# Patient Record
Sex: FEMALE | Race: ASIAN
[De-identification: names, ages, dates, MRNs, and addresses within clinical notes are randomized per-mention and may not be internally consistent; named-entity substitution may affect disease eponyms.]

---

## 2021-08-19 ENCOUNTER — DASHBOARD ENCOUNTERS (OUTPATIENT)
Age: 46
End: 2021-08-19

## 2021-08-19 ENCOUNTER — OFFICE VISIT (OUTPATIENT)
Dept: URBAN - METROPOLITAN AREA CLINIC 98 | Facility: CLINIC | Age: 46
End: 2021-08-19
Payer: COMMERCIAL

## 2021-08-19 ENCOUNTER — WEB ENCOUNTER (OUTPATIENT)
Dept: URBAN - METROPOLITAN AREA CLINIC 98 | Facility: CLINIC | Age: 46
End: 2021-08-19

## 2021-08-19 VITALS
TEMPERATURE: 96.9 F | WEIGHT: 144.6 LBS | DIASTOLIC BLOOD PRESSURE: 72 MMHG | HEART RATE: 82 BPM | SYSTOLIC BLOOD PRESSURE: 107 MMHG | BODY MASS INDEX: 23.24 KG/M2 | HEIGHT: 66 IN

## 2021-08-19 DIAGNOSIS — K64.8 INTERNAL HEMORRHOID: ICD-10-CM

## 2021-08-19 DIAGNOSIS — K59.09 CHRONIC CONSTIPATION: ICD-10-CM

## 2021-08-19 PROCEDURE — 99204 OFFICE O/P NEW MOD 45 MIN: CPT | Performed by: INTERNAL MEDICINE

## 2021-08-19 PROCEDURE — 99244 OFF/OP CNSLTJ NEW/EST MOD 40: CPT | Performed by: INTERNAL MEDICINE

## 2021-08-19 RX ORDER — ANTI-ITCH 1 G/100G
1 APPLICATION OINTMENT TOPICAL THREE TIMES A DAY
Qty: 1 | Refills: 1 | OUTPATIENT
Start: 2021-08-19 | End: 2021-09-02

## 2021-08-19 RX ORDER — WHEAT DEXTRIN 3 G/3.5 G
AS DIRECTED POWDER (GRAM) ORAL
Status: ACTIVE | COMMUNITY

## 2021-08-19 NOTE — HPI-TODAY'S VISIT:
Ms. Thornton is a 45 yo F presenting for evaluation of hemorrhoids and is referred by Dr. Lashay Baires.  A copy of this report will be sent to Dr. Baires.   She has had 2 weeks of anorectal burning every day when she has a bowel movements. She has hard stools and has seen black mixed in her stools twice in the last month. She has seen no red blood in her stools. She has been having hard, brown stools with terell. She has been using benefiber to help with her constipation and she has gone from having a bowel movement every 3 days to 2-3 bowel movements per day. Stools are mushy now. No heartburn, nausea or vomiting. No weight loss. She has had hemorrhoids for over 10 years. Constipation has been intermittent for several months.  She has tried hydrocortisone cream once daily for 2 weeks without improvement and she has used nitroglycerin in the past which helps decrease the pain.

## 2021-08-19 NOTE — EXAM-FUNCTIONAL ASSESSMENT
Constitutional: well-developed, normal communication ability.   Eyes: Conjunctivae and eyelids appear normal, no scleral icterus. Respiratory:  symmetric expansion of chest wall, normal work of breathing   Gastrointestinal:  normoactive bowel sounds, soft, no tenderness, no rebound tenderness, no shifting dullness, no organomegaly, Rectal exam- internal hemorrhoids, no blood in the stool, very tender to palpation. Chaperone present.   Musculoskeletal: normal gait and station, no tenderness present.   Skin: No jaundice   Neurologic: Oriented to person, place, time. Short term memory intact.    Psychiatric: Normal mood and appropriate affect.